# Patient Record
Sex: MALE
[De-identification: names, ages, dates, MRNs, and addresses within clinical notes are randomized per-mention and may not be internally consistent; named-entity substitution may affect disease eponyms.]

---

## 2023-07-08 ENCOUNTER — HOSPITAL ENCOUNTER (EMERGENCY)
Dept: HOSPITAL 97 - ER | Age: 63
Discharge: HOME | End: 2023-07-08
Payer: COMMERCIAL

## 2023-07-08 DIAGNOSIS — S49.91XA: ICD-10-CM

## 2023-07-08 DIAGNOSIS — S92.512A: Primary | ICD-10-CM
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
## 2023-07-08 DIAGNOSIS — S63.512A: ICD-10-CM
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
## 2023-07-08 DIAGNOSIS — S29.019A: ICD-10-CM

## 2023-07-08 LAB
ALBUMIN SERPL BCP-MCNC: 3.6 G/DL (ref 3.4–5)
ALP SERPL-CCNC: 83 U/L (ref 45–117)
ALT SERPL W P-5'-P-CCNC: 47 U/L (ref 16–61)
AST SERPL W P-5'-P-CCNC: 29 U/L (ref 15–37)
BILIRUB INDIRECT SERPL-MCNC: 0.4 MG/DL (ref 0.2–0.8)
BUN BLD-MCNC: 17 MG/DL (ref 7–18)
GLUCOSE SERPLBLD-MCNC: 102 MG/DL (ref 74–106)
HCT VFR BLD CALC: 45.2 % (ref 39.6–49)
INR BLD: 0.96
LYMPHOCYTES # SPEC AUTO: 1.1 K/UL (ref 0.7–4.9)
MAGNESIUM SERPL-MCNC: 2.2 MG/DL (ref 1.6–2.4)
MCV RBC: 96.2 FL (ref 80–100)
NT-PROBNP SERPL-MCNC: 73 PG/ML (ref ?–125)
PMV BLD: 6.7 FL (ref 7.6–11.3)
POTASSIUM SERPL-SCNC: 4.1 MEQ/L (ref 3.5–5.1)
RBC # BLD: 4.7 M/UL (ref 4.33–5.43)
TROPONIN I SERPL HS-MCNC: 4.8 PG/ML (ref ?–58.9)

## 2023-07-08 PROCEDURE — 83690 ASSAY OF LIPASE: CPT

## 2023-07-08 PROCEDURE — 83735 ASSAY OF MAGNESIUM: CPT

## 2023-07-08 PROCEDURE — 73030 X-RAY EXAM OF SHOULDER: CPT

## 2023-07-08 PROCEDURE — 83880 ASSAY OF NATRIURETIC PEPTIDE: CPT

## 2023-07-08 PROCEDURE — 80076 HEPATIC FUNCTION PANEL: CPT

## 2023-07-08 PROCEDURE — 36415 COLL VENOUS BLD VENIPUNCTURE: CPT

## 2023-07-08 PROCEDURE — 74177 CT ABD & PELVIS W/CONTRAST: CPT

## 2023-07-08 PROCEDURE — 85025 COMPLETE CBC W/AUTO DIFF WBC: CPT

## 2023-07-08 PROCEDURE — 99285 EMERGENCY DEPT VISIT HI MDM: CPT

## 2023-07-08 PROCEDURE — 96361 HYDRATE IV INFUSION ADD-ON: CPT

## 2023-07-08 PROCEDURE — 85610 PROTHROMBIN TIME: CPT

## 2023-07-08 PROCEDURE — 73110 X-RAY EXAM OF WRIST: CPT

## 2023-07-08 PROCEDURE — 71260 CT THORAX DX C+: CPT

## 2023-07-08 PROCEDURE — 71045 X-RAY EXAM CHEST 1 VIEW: CPT

## 2023-07-08 PROCEDURE — 70450 CT HEAD/BRAIN W/O DYE: CPT

## 2023-07-08 PROCEDURE — 96360 HYDRATION IV INFUSION INIT: CPT

## 2023-07-08 PROCEDURE — 93005 ELECTROCARDIOGRAM TRACING: CPT

## 2023-07-08 PROCEDURE — 84484 ASSAY OF TROPONIN QUANT: CPT

## 2023-07-08 PROCEDURE — 80048 BASIC METABOLIC PNL TOTAL CA: CPT

## 2023-07-08 PROCEDURE — 73630 X-RAY EXAM OF FOOT: CPT

## 2023-07-08 PROCEDURE — 72125 CT NECK SPINE W/O DYE: CPT

## 2023-07-08 NOTE — RAD REPORT
EXAM DESCRIPTION:  CT - Head C  Spine Cap W Con - 7/8/2023 2:14 pm

 

CLINICAL HISTORY:  Head and neck injury with chest and abdominal pain status post fall. Head and neck
 pain

.

 

TECHNIQUE:  Computed axial tomography of the head and cervical spine was obtained

 

Computed axial tomography of the chest, abdomen and pelvis was obtained. 100 cc Isovue-300 was given 
intravenously coronal and sagittal reconstruction was performed.

 

All CT scans are performed using dose optimization technique as appropriate and may include automated
 exposure control or mA/KV adjustment according to patient size.

 

COMPARISON:  none

 

FINDINGS:  An intracranial bleed is not seen. The ventricles are normal in caliber. An extra-axial fl
uid collection is not noted. Fluid within the sinuses is not seen

 

A cervical fracture is not seen. No dislocation is seen. 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
al stenosis

 

A mediastinal hematoma is not noted. A pleural effusion is not present. A lung contusion is not seen.


 

The liver, spleen, pancreas, adrenals, kidneys and bladder do not demonstrate an acute traumatic inju
ry

 

The appendix is dilated and fluid-filled measuring 1 millimeters.

 

Several thoracic and lumbar vertebra have a H shaped appearance. This is nonspecific but can be seen 
with anemia

 

Right hip prosthesis in place

 

IMPRESSION:  No acute intracranial abnormality is seen

 

A cervical fracture is not visualized. If the patient continues have symptoms to suggest intracranial
/spinal cord pathology then MRI would be recommended.

 

No acute traumatic injury involving the chest, abdomen or pelvis is seen.

 

Dilated appendix probably a mucocele

## 2023-07-08 NOTE — ER
Nurse's Notes                                                                                     

 [facility]                                                                 

Name: Obey Key                                                                             

Age: 63 yrs                                                                                       

Sex: Male                                                                                         

: 1960                                                                                   

MRN: [id number]                                                                                   

Arrival Date: 2023                                                                          

Time: 13:05                                                                                       

Account#: [id number]                                                                            

Bed 14                                                                                            

Private MD:                                                                                       

Diagnosis: Fall (on) (from) unspecified stairs and steps;Sprain of carpal joint of left           

  wrist;Fracture of proximal phalanx of lesser toe(s)-proximal left 4th;Pain in right             

  shoulder-internal derangement;Strain of muscle and tendon of unspecified wall of                

  thorax                                                                                          

                                                                                                  

Presentation:                                                                                     

                                                                                             

13:20 Chief complaint: Fell form 10 foot ladder approx 1 hour ago, c/o pain in neck, left     hb  

      face, left wrist, and right shoulder. Negative LOC, not on blood thinners. Coronavirus      

      screen: At this time, the client does not indicate any symptoms associated with             

      coronavirus-19. Ebola Screen: No symptoms or risks identified at this time. Initial         

      Sepsis Screen: Does the patient meet any 2 criteria? No. Patient's initial sepsis           

      screen is negative. Does the patient have a suspected source of infection? No.              

      Patient's initial sepsis screen is negative. Risk Assessment: Do you want to hurt           

      yourself or someone else? Patient reports no desire to harm self or others. Onset of        

      symptoms was 2023.                                                                 

13:20 Method Of Arrival: Ambulatory                                                           hb  

13:20 Acuity: PRIYANKA 2                                                                           hb  

13:24 Care prior to arrival: None. Mechanism of Injury: Fall from ladder approximately 10     hb  

      feet. Trauma event details: Injury occurred in the [facility], Injury occurred:     

      at home. Injury occurred: 2023 Injury occurred at: 12:30.                          

                                                                                                  

Trauma Activation: Alert                                                                          

 Physician: ED Physician; Name: Dr. Mora; Notified At:  13:19; Arrived              

  At:  13:20                                                                            

 Physician: General Surgeon; Name: ; Notified At:  13:19; Arrived At:                   

 Physician: Radiology; Name: ; Notified At:  13:19; Arrived At:                         

 Physician: Respiratory; Name: ; Notified At:  13:19; Arrived At:                       

 Physician: Lab; Name: ; Notified At:  13:19; Arrived At:                               

                                                                                                  

Historical:                                                                                       

- Allergies:                                                                                      

13:21 No Known Allergies;                                                                     hb  

- Home Meds:                                                                                      

13:21 atorvastatin oral [Active]; citalopram oral [Active];                                   hb  

- PMHx:                                                                                           

13:21 high cholesterol; Anxiety;                                                              hb  

- PSHx:                                                                                           

13:21 Right Hip Replacement;                                                                  hb  

                                                                                                  

- Immunization history:: Adult Immunizations up to date.                                          

- Social history:: Smoking status: Patient denies any tobacco usage or history of.                

- Immunization history: Last tetanus immunization: unknown.                                       

                                                                                                  

                                                                                                  

Screenin:48 Abuse screen: Denies threats or abuse. Denies injuries from another. Tuberculosis       kc6 

      screening: No symptoms or risk factors identified.                                          

13:49 [facility] ED Fall Risk Assessment (Adult) History of falling in the last 3 months,       kc6 

      including since admission Yes- single mechanical fall (1 pt) Confusion or                   

      Disorientation No (0 pts) Intoxicated or Sedated No (0 pts) Impaired Gait No (0 pts)        

      Mobility Assist Device Used No (0 pt) Altered Elimination No (0 pt) Score/Fall Risk         

      Level 0 - 2 = Low Risk Oriented to surroundings, Maintained a safe environment,             

      Educated pt \T\ family on fall prevention, incl call for assistance when getting out of     

      bed, Assessed \T\ reinforced patient's understanding of fall precautions, Hourly rounding   

      (assess needs \T\ fall precautionary measures) done. Nutritional screening: No deficits     

      noted.                                                                                      

                                                                                                  

Primary Survey:                                                                                   

13:19 NO uncontrolled hemorrhage observed. A: The client is awake and alert. The airway is    hb  

      patent. Breathing/Chest: Spontaneous respiratory effort, equal unlabored respirations,      

      breath sounds clear bilaterally, regular pattern, symmetrical chest rise and fall.          

      Circulation: No external hemorrhage present. Regular and strong central pulse, skin         

      warm/dry/normal color. Disability Client is alert. Exposure/Environment: No obvious         

      injuries are noted at this time.                                                            

13:50 Reassessment Alertness and Airway: Awake and alert. The airway is patent. Breathing:    kc6 

      Spontaneous respiratory effort, equal unlabored respirations, breath sounds clear           

      bilaterally, regular pattern with symmetrical chest rise and fall. Circulation: No          

      external hemorrhage noted. Regular and strong central pulse, skin warm/dry/normal           

      color. Disability: Pupils Pupils are equal, round, reactive to light and accomodation.      

      Alert.                                                                                      

                                                                                                  

Assessment:                                                                                       

13:48 General: Appears in no apparent distress. comfortable, Behavior is calm, cooperative,   kc6 

      appropriate for age. Pain: Complains of pain in right shoulder, left wrist, right foot.     

      Neuro: Level of Consciousness is awake, alert, obeys commands, Oriented to person,          

      place, time, situation, Appropriate for age. EENT: No signs and/or symptoms were            

      reported regarding the EENT system. Cardiovascular: Capillary refill < 3 seconds.           

      Respiratory: Airway is patent Trachea midline Respiratory effort is even, unlabored,        

      Respiratory pattern is regular, symmetrical. GI: No signs and/or symptoms were reported     

      involving the gastrointestinal system. : No signs and/or symptoms were reported           

      regarding the genitourinary system. Derm: No signs and/or symptoms reported regarding       

      the dermatologic system. Skin is intact, Skin is pink, warm \T\ dry. Musculoskeletal: No    

      signs and/or symptoms reported regarding the musculoskeletal system. Circulation,           

      motion, and sensation intact. Capillary refill < 3 seconds, Range of motion: intact in      

      all extremities.                                                                            

14:50 Reassessment: Patient appears in no apparent distress at this time. No changes from     kc6 

      previously documented assessment. Patient and/or family updated on plan of care and         

      expected duration. Pain level reassessed. Patient is alert, oriented x 3, equal             

      unlabored respirations, skin warm/dry/pink.                                                 

                                                                                                  

Vital Signs:                                                                                      

13:20  / 88; Pulse 67; Resp 18; Temp 98.2; Pulse Ox 97% on R/A; Weight 108.86 kg;       hb  

      Height 6 ft. 0 in. ; Pain 7/10;                                                             

14:50  / 77; Pulse 77; Resp 16 S; Pulse Ox 97% on R/A;                                  kc6 

13:20 Body Mass Index 32.55 (108.86 kg, 182.88 cm)                                            hb  

13:20 Pain Scale: Adult                                                                       hb  

                                                                                                  

[place] Coma Score:                                                                               

13:50 Eye Response: spontaneous(4). Motor Response: obeys commands(6). Verbal Response:       kc6 

      oriented(5). Total: 15.                                                                     

                                                                                                  

Trauma Score (Adult):                                                                             

13:50 Eye Response: spontaneous(1); Verbal Response: oriented(1); Motor Response: obeys       kc6 

      commands(2); Systolic BP: > 89 mm Hg(4); Respiratory Rate: 10 to 29 per min(4); Declan     

      Score: 15; Trauma Score: 12                                                                 

                                                                                                  

ED Course:                                                                                        

13:07 Patient arrived in ED.                                                                  ts1 

13:15 Alec Mora MD is Attending Physician.                                             pete 

13:18 Chey Alvarenga, RN is Primary Nurse.                                                 kc6 

13:21 Triage completed.                                                                       hb  

13:22 Patient has correct armband on for positive identification. Bed in low position. Call   mm9 

      light in reach. Side rails up X 1. Warm blanket given. Pillow given. Pulse ox on. NIBP      

      on.                                                                                         

13:22 EKG done, by ED staff, reviewed by Alec Mora MD.                                   mm9 

13:27 Initial lab(s) drawn, by ED staff, sent to lab.                                         mm9 

13:28 Cardiac monitor on.                                                                     mm9 

13:48 XRAY Chest (1 view) In Process Unspecified.                                             EDMS

13:48 Foot Right 3 View XRAY In Process Unspecified.                                          EDMS

13:48 Shoulder Right (2 View) XRAY In Process Unspecified.                                    EDMS

13:48 Wrist Left (3 View) XRAY In Process Unspecified.                                        EDMS

13:50 Patient maintains SpO2 saturation greater than 95% on room air.                         kc6 

13:50 Thermoregulation: warm blanket given to patient.                                        kc6 

13:51 Arm band placed on.                                                                     kc6 

14:16 CT Traumagram (Head C Spine CAP W Con) In Process Unspecified.                          EDMS

15:15 Jony Rothman MD is Referral Physician.                                                pete 

15:27 INCENTIVE SPIROMETRY Sent.                                                              mm9 

15:27 Velcro wrist splint applied to left wrist. Ortho shoe applied to right foot. Sling      mm9 

      applied to right arm. BUDDIE TAPE LITTLE TOES.                                              

15:43 No provider procedures requiring assistance completed. IV discontinued, intact,         kc6 

      bleeding controlled, No redness/swelling at site. Pressure dressing applied.                

                                                                                                  

Administered Medications:                                                                         

13:16 CANCELLED (Duplicate Order): NS 0.9% IV 1000 ml IV at 125 ml/hr continuous              pete 

13:26 Drug: NS 0.9% IV 1000 ml Route: IV; Rate: 125 ml/hr; Site: right antecubital;           kc6 

15:43 Follow up: Response: No adverse reaction; IV Status: Order to discontinue infusion      kc6 

14:52 Not Given (Patient Refused): morphine IVP or IV 2 mg IVP once over 4 mins               kc6 

14:52 Not Given (Patient Refused): morphine IVP or IV 2 mg IVP once over 4 mins               kc6 

14:53 Not Given (Patient Refused): Ondansetron IVP 4 mg IVP once; over 2 minutes              kc6 

                                                                                                  

                                                                                                  

Medication:                                                                                       

15:44 VIS not applicable for this client.                                                     kc6 

                                                                                                  

Outcome:                                                                                          

15:19 Discharge ordered by MD. freeman 

15:43 Discharged to home ambulatory, with significant other.                                  kc6 

15:43 Condition: improved                                                                         

15:43 Discharge instructions given to patient, Instructed on discharge instructions, follow       

      up and referral plans. medication usage, Demonstrated understanding of instructions,        

      follow-up care, medications, Prescriptions given X 3.                                       

15:44 Patient left the ED.                                                                    kc6 

                                                                                                  

Signatures:                                                                                       

Dispatcher [employer]                           EDMS                                                 

Alec Mora MD MD cha Baxter, Heather, RN                     RN   Chey Pacheco RN RN kc6                                                  

Faye Carmona                              mm9                                                  

Brittney Saldivar PAS PAS  ts1                                                  

                                                                                                  

**************************************************************************************************

## 2023-07-08 NOTE — RAD REPORT
EXAM DESCRIPTION:  RAD - Wrist Left 3 View - 7/8/2023 1:46 pm

 

CLINICAL HISTORY:   Left wrist pain status post injury

 

FINDINGS:   6 millimeter and 3 millimeter bony/ calcific densities lie along the dorsal aspect of the
 wrist. I suspect both are chronic. However, it is possible that 1 or both represent acute avulsion f
ractures.

 

Clinical correlation is needed see if patient has point tenderness in this region to suggest this.

 

No dislocation

 

7 millimeter scaphoid cyst

## 2023-07-08 NOTE — RAD REPORT
EXAM DESCRIPTION:  RAD - Foot Right 3 View - 7/8/2023 1:46 pm

 

CLINICAL HISTORY:   Right foot pain status post injury

 

FINDINGS:  Mildly to moderately displaced intra-articular fracture distal fourth proximal phalanx

 

Small avulsion fracture proximal aspect of fourth distal phalanx

 

No dislocation

## 2023-07-08 NOTE — RAD REPORT
EXAM DESCRIPTION:  RAD - Shoulder  Right 2 View - 7/8/2023 1:46 pm

 

CLINICAL HISTORY:  Right shoulder pain

 

FINDINGS:  No fracture or dislocation is seen.

## 2023-07-08 NOTE — EDPHYS
Physician Documentation                                                                           

 [facility]                                                                 

Name: Obey Key                                                                             

Age: 63 yrs                                                                                       

Sex: Male                                                                                         

: 1960                                                                                   

MRN: [id number]                                                                                   

Arrival Date: 2023                                                                          

Time: 13:05                                                                                       

Account#: [id number]                                                                            

Bed 14                                                                                            

Private MD:                                                                                       

ED Physician Alec Mora                                                                      

HPI:                                                                                              

                                                                                             

13:49 This 63 yrs old  Male presents to ER via Ambulatory with complaints of Fall    pete 

      Injury.                                                                                     

13:49 The patient or guardian complains of decreased range of motion, pain, that is acute.    pete 

      right shoulder. Context: The problem was sustained at home, resulted from a fall, down      

      stairs. Onset: The symptoms/episode began/occurred just prior to arrival. Modifying         

      factors: the symptoms are alleviated by remaining still, The symptoms are aggravated by     

      movement.                                                                                   

                                                                                                  

Historical:                                                                                       

- Allergies:                                                                                      

13:21 No Known Allergies;                                                                     hb  

- Home Meds:                                                                                      

13:21 atorvastatin oral [Active]; citalopram oral [Active];                                   hb  

- PMHx:                                                                                           

13:21 high cholesterol; Anxiety;                                                              hb  

- PSHx:                                                                                           

13:21 Right Hip Replacement;                                                                  hb  

                                                                                                  

- Immunization history:: Adult Immunizations up to date.                                          

- Social history:: Smoking status: Patient denies any tobacco usage or history of.                

- Immunization history: Last tetanus immunization: unknown.                                       

                                                                                                  

                                                                                                  

ROS:                                                                                              

13:52 Constitutional: Negative for fever, chills, and weight loss, Eyes: Negative for injury, pete 

      pain, redness, and discharge, ENT: Negative for injury, pain, and discharge, Neck:          

      Negative for injury, pain, and swelling, Cardiovascular: Negative for chest pain,           

      palpitations, and edema, Respiratory: Negative for shortness of breath, cough,              

      wheezing, and pleuritic chest pain, Abdomen/GI: Negative for abdominal pain, nausea,        

      vomiting, diarrhea, and constipation, Back: Negative for injury and pain, : Negative      

      for injury, bleeding, discharge, and swelling, Skin: Negative for injury, rash, and         

      discoloration, Neuro: Negative for headache, weakness, numbness, tingling, and seizure,     

      Psych: Negative for depression, anxiety, suicide ideation, homicidal ideation, and          

      hallucinations, Allergy/Immunology: Negative for hives, rash, and allergies, Endocrine:     

      Negative for neck swelling, polydipsia, polyuria, polyphagia, and marked weight             

      changes, Hematologic/Lymphatic: Negative for swollen nodes, abnormal bleeding, and          

      unusual bruising.                                                                           

13:52 MS/extremity: Positive for decreased range of motion, pain, tenderness, of the left         

      hand, right foot, anterior aspect of right shoulder and posterior aspect of right           

      shoulder.                                                                                   

                                                                                                  

Exam:                                                                                             

13:52 Constitutional:  This is a well developed, well nourished patient who is awake, alert,  pete 

      and in no acute distress. Head/Face:  Normocephalic, atraumatic. Eyes:  Pupils equal        

      round and reactive to light, extra-ocular motions intact.  Lids and lashes normal.          

      Conjunctiva and sclera are non-icteric and not injected.  Cornea within normal limits.      

      Periorbital areas with no swelling, redness, or edema. ENT:  Nares patent. No nasal         

      discharge, no septal abnormalities noted.  Tympanic membranes are normal and external       

      auditory canals are clear.  Oropharynx with no redness, swelling, or masses, exudates,      

      or evidence of obstruction, uvula midline.  Mucous membranes moist. Neck:  Trachea          

      midline, no thyromegaly or masses palpated, and no cervical lymphadenopathy.  Supple,       

      full range of motion without nuchal rigidity, or vertebral point tenderness.  No            

      Meningismus. Chest/axilla:  Normal chest wall appearance and motion.  Nontender with no     

      deformity.  No lesions are appreciated. Cardiovascular:  Regular rate and rhythm with a     

      normal S1 and S2.  No gallops, murmurs, or rubs.  Normal PMI, no JVD.  No pulse             

      deficits. Respiratory:  Lungs have equal breath sounds bilaterally, clear to                

      auscultation and percussion.  No rales, rhonchi or wheezes noted.  No increased work of     

      breathing, no retractions or nasal flaring. Abdomen/GI:  Soft, non-tender, with normal      

      bowel sounds.  No distension or tympany.  No guarding or rebound.  No evidence of           

      tenderness throughout. Back:  No spinal tenderness.  No costovertebral tenderness.          

      Full range of motion. Skin:  Warm, dry with normal turgor.  Normal color with no            

      rashes, no lesions, and no evidence of cellulitis. Neuro:  Awake and alert, GCS 15,         

      oriented to person, place, time, and situation.  Cranial nerves II-XII grossly intact.      

      Motor strength 5/5 in all extremities.  Sensory grossly intact.  Cerebellar exam            

      normal.  Normal gait. Psych:  Awake, alert, with orientation to person, place and time.     

       Behavior, mood, and affect are within normal limits.                                       

13:52 ECG was reviewed by the Attending Physician.                                                

13:52 Musculoskeletal/extremity: Extremities: noted in the scalp, left hand, right foot and       

      right arm: decreased ROM, pain, DVT Exam: No signs of deep vein thrombosis. no              

      swelling, no tenderness, negative Homans' sign noted on exam, no appreciated bluish         

      discoloration, no erythema, no increased warmth, pain.                                      

                                                                                                  

Vital Signs:                                                                                      

13:20  / 88; Pulse 67; Resp 18; Temp 98.2; Pulse Ox 97% on R/A; Weight 108.86 kg;       hb  

      Height 6 ft. 0 in. ; Pain 7/10;                                                             

14:50  / 77; Pulse 77; Resp 16 S; Pulse Ox 97% on R/A;                                  kc6 

13:20 Body Mass Index 32.55 (108.86 kg, 182.88 cm)                                            hb  

13:20 Pain Scale: Adult                                                                       hb  

                                                                                                  

Declan Coma Score:                                                                               

13:50 Eye Response: spontaneous(4). Motor Response: obeys commands(6). Verbal Response:       kc6 

      oriented(5). Total: 15.                                                                     

                                                                                                  

Trauma Score (Adult):                                                                             

13:50 Eye Response: spontaneous(1); Verbal Response: oriented(1); Motor Response: obeys       kc6 

      commands(2); Systolic BP: > 89 mm Hg(4); Respiratory Rate: 10 to 29 per min(4); Declan     

      Score: 15; Trauma Score: 12                                                                 

                                                                                                  

MDM:                                                                                              

13:15 Patient medically screened.                                                             pete 

13:54 Differential diagnosis: Anterior dislocation with fracture, Posterior dislocation with  pete 

      fracture, humeral head fracture, glenoid fracture, DJD, tendonitis. Differential            

      diagnosis: abrasion, closed head injury, contusion, fracture, laceration, multiple          

      trauma, sprain, strain. Data reviewed: vital signs, nurses notes, lab test result(s),       

      EKG, radiologic studies, CT scan, plain films. Consideration of Admission/Observation       

      Escalation of care including admission/observation considered. I considered the             

      following discharge prescriptions or medication management in the emergency department      

      Medications were administered in the Emergency Department. See MAR. Test considered but     

      Not performed: MRI: no mri. Care significantly affected by the following chronic            

      conditions: high cholesterol, anxiey. Counseling: I had a detailed discussion with the      

      patient and/or guardian regarding: the historical points, exam findings, and any            

      diagnostic results supporting the discharge/admit diagnosis, lab results, radiology         

      results, the need for outpatient follow up.                                                 

                                                                                                  

                                                                                             

13:15 Order name: Basic Metabolic Panel; Complete Time: 14:54                                 pete 

                                                                                             

13:15 Order name: CBC with Diff; Complete Time: 14:54                                                                                                                                      

13:15 Order name: LFT's; Complete Time: 14:54                                                                                                                                              

13:15 Order name: Magnesium; Complete Time: 14:54                                                                                                                                          

13:15 Order name: NT PRO-BNP; Complete Time: 14:54                                                                                                                                         

13:15 Order name: PT-INR; Complete Time: 14:54                                                                                                                                             

13:15 Order name: Troponin HS; Complete Time: 14:54                                           [facility] 

                                                                                             

13:22 Order name: Lipase; Complete Time: 14:54                                                [facility] 

                                                                                             

13:15 Order name: XRAY Chest (1 view); Complete Time: 14:54                                                                                                                                

13:22 Order name: Foot Right 3 View XRAY; Complete Time: 14:54                                                                                                                             

13:22 Order name: Shoulder Right (2 View) XRAY; Complete Time: 14:54                                                                                                                       

13:22 Order name: Wrist Left (3 View) XRAY; Complete Time: 14:54                              [facility] 

                                                                                             

13:22 Order name: CT Traumagram (Head C Spine CAP W Con); Complete Time: 14:54                [facility] 

                                                                                             

15:22 Order name: INCENTIVE SPIROMETRY                                                                                                                                                     

13:15 Order name: EKG; Complete Time: 13:16                                                                                                                                                

13:15 Order name: Cardiac monitoring; Complete Time: 13:23                                    [facility] 

                                                                                             

13:15 Order name: EKG - Nurse/Tech; Complete Time: 13:23                                                                                                                                   

13:15 Order name: IV Saline Lock; Complete Time: 13:26                                        [facility] 

                                                                                             

13:15 Order name: Labs collected and sent; Complete Time: 13:26                               [facility] 

                                                                                             

13:15 Order name: O2 Per Protocol; Complete Time: 13:17                                                                                                                                    

13:15 Order name: O2 Sat Monitoring; Complete Time: 13:17                                     pete 

                                                                                             

15:11 Order name: Post-op shoe; Complete Time: 15:27                                                                                                                                       

15:11 Order name: Misc. Order: buddy tape toes left 3-4-5; Complete Time: 15:27               [facility] 

                                                                                             

15:11 Order name: Splint - Volar Wrist Splint; Complete Time: 15:                           pete 

                                                                                             

15:13 Order name: Sling: right shoulder; Complete Time: 15:                                 pete 

                                                                                                  

EC:52 Rate is 69 beats/min. Rhythm is regular. QRS Axis is Normal. [place] interval is normal. QRS pete 

      interval is normal. QT interval is normal. No Q waves. T waves are Normal. No ST            

      changes noted. Clinical impression: Normal ECG and No evidence of ischemia. Interpreted     

      by me. Reviewed by me.                                                                      

                                                                                                  

Administered Medications:                                                                         

13:16 CANCELLED (Duplicate Order): NS 0.9% IV 1000 ml IV at 125 ml/hr continuous              pete 

13:26 Drug: NS 0.9% IV 1000 ml Route: IV; Rate: 125 ml/hr; Site: right antecubital;           kc6 

15:43 Follow up: Response: No adverse reaction; IV Status: Order to discontinue infusion      kc6 

14:52 Not Given (Patient Refused): morphine IVP or IV 2 mg IVP once over 4 mins               kc6 

14:52 Not Given (Patient Refused): morphine IVP or IV 2 mg IVP once over 4 mins               kc6 

14:53 Not Given (Patient Refused): Ondansetron IVP 4 mg IVP once; over 2 minutes              kc6 

                                                                                                  

                                                                                                  

Disposition Summary:                                                                              

23 15:19                                                                                    

Discharge Ordered                                                                                 

      Location: Home                                                                          pete 

      Problem: new                                                                            pete 

      Symptoms: have improved                                                                 pete 

      Condition: Stable                                                                       pete 

      Diagnosis                                                                                   

        - Fall (on) (from) unspecified stairs and steps                                       epte 

        - Sprain of carpal joint of left wrist                                                pete 

        - Fracture of proximal phalanx of lesser toe(s) - proximal left 4th                   pete 

        - Pain in right shoulder - internal derangement                                       pete 

        - Strain of muscle and tendon of unspecified wall of thorax                           pete 

      Followup:                                                                               pete 

        - With: Private Physician                                                                  

        - When: 2 - 3 days                                                                         

        - Reason: Recheck today's complaints, Continuance of care, Re-evaluation by your           

      physician                                                                                   

      Followup:                                                                               pete 

        - With: Jony Rothman MD                                                                  

        - When: 2 - 3 days                                                                         

        - Reason: Recheck today's complaints, Re-evaluation by your physician                      

      Discharge Instructions:                                                                     

        - Discharge Summary Sheet                                                             pete 

        - Joint Pain                                                                          pete 

        - Musculoskeletal Pain                                                                pete 

        - Shoulder Pain                                                                       pete 

        - How to Use an Incentive Spirometer                                                  pete 

        - Shoulder Pain, Easy-to-Read                                                         pete 

        - Arthritis, Easy-to-Read                                                             pete 

        - How to Use Cold Therapy                                                             pete 

        - Incentive Spirometer Record                                                         [facility] 

      Forms:                                                                                      

        - Medication Reconciliation Form                                                      pete 

        - Thank You Letter                                                                    pete 

        - Antibiotic Education                                                                pete 

        - Prescription Opioid Use                                                             [facility] 

        - [email or web address]                                                 pete 

      Prescriptions:                                                                              

        - acetaminophen-codeine 300-30 mg Oral tablet                                              

            - take 2 tablet by ORAL route every 6 hours; 24 tablet; Refills: 0, Product       [facility] 

      Selection Permitted                                                                         

        - Diclofenac Sodium 75 mg Oral tablet,delayed release (DR/EC)                              

            - take 1 tablet by ORAL route 2 times per day; 20 tablet; Refills: 0, Product     [facility] 

      Selection Permitted                                                                         

        - Cyclobenzaprine 5 mg Oral Tablet                                                         

            - take 1 tablet by ORAL route 3 times per day As needed; 15 tablet; Refills: 0,   [facility] 

      Product Selection Permitted                                                                 

Signatures:                                                                                       

Dispatcher [employer]                           Alec Barker MD MD cha Baxter, Heather RN                     RN   Chey Pacheco RN                   RN   kc6                                                  

                                                                                                  

Corrections: (The following items were deleted from the chart)                                    

13:16 13:15 NS 0.9% IV 1000 ml IV at 125 ml/hr continuous ordered. pete freeman 

                                                                                                  

**************************************************************************************************

## 2023-07-08 NOTE — RAD REPORT
EXAM DESCRIPTION:  April Single View7/8/2023 1:46 pm

 

CLINICAL HISTORY:  Chest pain

 

COMPARISON:  none

 

FINDINGS:   The lungs appear clear of acute infiltrate. The heart is normal size

 

IMPRESSION:  No acute abnormalities displayed

## 2023-07-10 NOTE — EKG
Test Date:    2023-07-08               Test Time:    13:28:38

Technician:   GM                                    

                                                     

MEASUREMENT RESULTS:                                       

Intervals:                                           

Rate:         69                                     

MA:           168                                    

QRSD:         76                                     

QT:           380                                    

QTc:          407                                    

Axis:                                                

P:            46                                     

MA:           168                                    

QRS:          71                                     

T:            66                                     

                                                     

INTERPRETIVE STATEMENTS:                                       

                                                     

Normal sinus rhythm

Normal ECG

No previous ECG available for comparison



Electronically Signed On 07-10-23 17:13:25 CDT by Bran Lew